# Patient Record
Sex: MALE | URBAN - METROPOLITAN AREA
[De-identification: names, ages, dates, MRNs, and addresses within clinical notes are randomized per-mention and may not be internally consistent; named-entity substitution may affect disease eponyms.]

---

## 2023-11-22 ENCOUNTER — APPOINTMENT (RX ONLY)
Dept: URBAN - METROPOLITAN AREA CLINIC 74 | Facility: CLINIC | Age: 63
Setting detail: DERMATOLOGY
End: 2023-11-22

## 2023-11-22 DIAGNOSIS — I87.2 VENOUS INSUFFICIENCY (CHRONIC) (PERIPHERAL): ICD-10-CM

## 2023-11-22 DIAGNOSIS — M79.3 PANNICULITIS, UNSPECIFIED: ICD-10-CM

## 2023-11-22 PROBLEM — I83.11 VARICOSE VEINS OF RIGHT LOWER EXTREMITY WITH INFLAMMATION: Status: ACTIVE | Noted: 2023-11-22

## 2023-11-22 PROCEDURE — ? PATIENT SPECIFIC COUNSELING

## 2023-11-22 PROCEDURE — ? COUNSELING

## 2023-11-22 PROCEDURE — ? PRESCRIPTION

## 2023-11-22 PROCEDURE — ? TREATMENT REGIMEN

## 2023-11-22 PROCEDURE — 99204 OFFICE O/P NEW MOD 45 MIN: CPT

## 2023-11-22 RX ORDER — MUPIROCIN 20 MG/G
OINTMENT TOPICAL
Qty: 22 | Refills: 2 | Status: ERX | COMMUNITY
Start: 2023-11-22

## 2023-11-22 RX ADMIN — MUPIROCIN: 20 OINTMENT TOPICAL at 00:00

## 2023-11-22 ASSESSMENT — LOCATION DETAILED DESCRIPTION DERM
LOCATION DETAILED: RIGHT PROXIMAL PRETIBIAL REGION
LOCATION DETAILED: RIGHT DISTAL PRETIBIAL REGION

## 2023-11-22 ASSESSMENT — LOCATION SIMPLE DESCRIPTION DERM: LOCATION SIMPLE: RIGHT PRETIBIAL REGION

## 2023-11-22 ASSESSMENT — LOCATION ZONE DERM: LOCATION ZONE: LEG

## 2023-11-22 NOTE — PROCEDURE: MIPS QUALITY
Quality 111:Pneumonia Vaccination Status For Older Adults: Patient did not receive any pneumococcal conjugate or polysaccharide vaccine on or after their 60th birthday and before the end of the measurement period
Quality 226: Preventive Care And Screening: Tobacco Use: Screening And Cessation Intervention: Patient screened for tobacco use and is an ex/non-smoker
Quality 130: Documentation Of Current Medications In The Medical Record: Current Medications Documented
Additional Notes: Pt denied pneumonia vaccine
Quality 110: Preventive Care And Screening: Influenza Immunization: Influenza Immunization Administered during Influenza season
Detail Level: Detailed

## 2023-11-22 NOTE — HPI: RASH
How Severe Is Your Rash?: moderate
Is This A New Presentation, Or A Follow-Up?: Rash
Additional History: Pt presents with a rash on his right leg, he explains that he has neuropathy so he doesn’t have much feeling but when his wife cleans it he feels burning occasionally. Pts wife explains she cleans it with saline and neosporin.

## 2023-11-22 NOTE — PROCEDURE: PATIENT SPECIFIC COUNSELING
Detail Level: Zone
Discussed its Increased pressure building up in the blood bessels is causing build up and backflow pf serious fluid causing the wheeping \\nInformed pt adequate oxygen and tissue is not getting to the tissue
Discussed cell turnover rate is slowed so \\nInformed pt that it’s basically a chronic condition but there are things to apply to make it look better\\nSending mupirocin \\nAlso recommend Amlactin lotion as well as cetaphil healing ointment\\nRecommended pt soak foot in epson salt bath, not too hot to help exfoliate \\nInformed pt it may take forever to heal

## 2023-11-22 NOTE — PROCEDURE: TREATMENT REGIMEN
Initiate Treatment: mupirocin 2 % topical ointment \\nQuantity: 22.0 g  Days Supply: 30\\nSig: Apply to wound daily
Otc Regimen: Amlatin lotion\\nCetaphil healing ointment\\nEpson salt bath soaks
Detail Level: Zone

## 2023-12-22 ENCOUNTER — APPOINTMENT (RX ONLY)
Dept: URBAN - METROPOLITAN AREA CLINIC 74 | Facility: CLINIC | Age: 63
Setting detail: DERMATOLOGY
End: 2023-12-22

## 2023-12-22 DIAGNOSIS — I87.2 VENOUS INSUFFICIENCY (CHRONIC) (PERIPHERAL): ICD-10-CM | Status: RESOLVING

## 2023-12-22 DIAGNOSIS — M79.3 PANNICULITIS, UNSPECIFIED: ICD-10-CM

## 2023-12-22 PROBLEM — I83.11 VARICOSE VEINS OF RIGHT LOWER EXTREMITY WITH INFLAMMATION: Status: ACTIVE | Noted: 2023-12-22

## 2023-12-22 PROCEDURE — 99213 OFFICE O/P EST LOW 20 MIN: CPT

## 2023-12-22 PROCEDURE — ? COUNSELING

## 2023-12-22 PROCEDURE — ? TREATMENT REGIMEN

## 2023-12-22 PROCEDURE — ? PATIENT SPECIFIC COUNSELING

## 2023-12-22 ASSESSMENT — LOCATION DETAILED DESCRIPTION DERM
LOCATION DETAILED: RIGHT DISTAL PRETIBIAL REGION
LOCATION DETAILED: RIGHT PROXIMAL PRETIBIAL REGION

## 2023-12-22 ASSESSMENT — LOCATION ZONE DERM: LOCATION ZONE: LEG

## 2023-12-22 ASSESSMENT — LOCATION SIMPLE DESCRIPTION DERM: LOCATION SIMPLE: RIGHT PRETIBIAL REGION

## 2023-12-22 NOTE — PROCEDURE: PATIENT SPECIFIC COUNSELING
Detail Level: Zone
Pt explains the mupirocin has been causing burning \\nInformed pt we did that to ensure no bacteria or infection\\nRecommend pt stop the mupirocin now because its likley causing kore irritation \\nRecommend pt continue the cerave healing ointment \\nPt mentions it still leaks \\nDiscussed the leaking is due to buildup of pressure \\nDiscussed and recommended compression stockings, 20-30, anytime leg is not elevated, to counteract pressure
Discussed cell turnover rate is slowed so \\nInformed pt that it’s basically a chronic condition but there are things to apply to make it look better\\nSending mupirocin \\nAlso recommend Amlactin lotion as well as cetaphil healing ointment\\nRecommended pt soak foot in epson salt bath, not too hot to help exfoliate \\nInformed pt it may take forever to heal

## 2023-12-22 NOTE — PROCEDURE: TREATMENT REGIMEN
Discontinue Regimen: mupirocin 2 % topical ointment \\nSig: Apply to wound daily
Otc Regimen: Cetaphil healing ointment\\nEpson salt bath soaks\\nCompression stockings 20-30
Detail Level: Zone

## 2024-03-22 ENCOUNTER — APPOINTMENT (RX ONLY)
Dept: URBAN - METROPOLITAN AREA CLINIC 74 | Facility: CLINIC | Age: 64
Setting detail: DERMATOLOGY
End: 2024-03-22

## 2024-03-22 DIAGNOSIS — I87.2 VENOUS INSUFFICIENCY (CHRONIC) (PERIPHERAL): ICD-10-CM | Status: RESOLVING

## 2024-03-22 DIAGNOSIS — M79.3 PANNICULITIS, UNSPECIFIED: ICD-10-CM

## 2024-03-22 PROCEDURE — ? COUNSELING

## 2024-03-22 PROCEDURE — 99213 OFFICE O/P EST LOW 20 MIN: CPT

## 2024-03-22 PROCEDURE — ? TREATMENT REGIMEN

## 2024-03-22 PROCEDURE — ? PATIENT SPECIFIC COUNSELING

## 2024-03-22 ASSESSMENT — LOCATION ZONE DERM: LOCATION ZONE: LEG

## 2024-03-22 ASSESSMENT — LOCATION SIMPLE DESCRIPTION DERM: LOCATION SIMPLE: RIGHT PRETIBIAL REGION

## 2024-03-22 NOTE — PROCEDURE: PATIENT SPECIFIC COUNSELING
Detail Level: Zone
Rechecked pts legs, see lots of improvement \\nDiscussed he’s still keeping up with compression stockings \\nDiscussed the antibiotics that he was on previously from pneumonia helped decrease inflammation \\nRecommended pt continue current treatment and rtc 6 months

## 2024-04-30 ENCOUNTER — APPOINTMENT (RX ONLY)
Dept: URBAN - METROPOLITAN AREA CLINIC 74 | Facility: CLINIC | Age: 64
Setting detail: DERMATOLOGY
End: 2024-04-30

## 2024-04-30 DIAGNOSIS — L24 IRRITANT CONTACT DERMATITIS: ICD-10-CM

## 2024-04-30 PROBLEM — L24.9 IRRITANT CONTACT DERMATITIS, UNSPECIFIED CAUSE: Status: ACTIVE | Noted: 2024-04-30

## 2024-04-30 PROCEDURE — ? COUNSELING

## 2024-04-30 PROCEDURE — ? PATIENT SPECIFIC COUNSELING

## 2024-04-30 PROCEDURE — ? PRESCRIPTION

## 2024-04-30 PROCEDURE — 99213 OFFICE O/P EST LOW 20 MIN: CPT

## 2024-04-30 PROCEDURE — ? TREATMENT REGIMEN

## 2024-04-30 RX ORDER — TRIAMCINOLONE ACETONIDE 0.25 MG/G
CREAM TOPICAL
Qty: 80 | Refills: 2 | Status: ERX | COMMUNITY
Start: 2024-04-30

## 2024-04-30 RX ADMIN — TRIAMCINOLONE ACETONIDE: 0.25 CREAM TOPICAL at 00:00

## 2024-04-30 ASSESSMENT — LOCATION DETAILED DESCRIPTION DERM: LOCATION DETAILED: LEFT KNEE

## 2024-04-30 ASSESSMENT — LOCATION ZONE DERM: LOCATION ZONE: LEG

## 2024-04-30 ASSESSMENT — LOCATION SIMPLE DESCRIPTION DERM: LOCATION SIMPLE: LEFT KNEE

## 2024-04-30 NOTE — PROCEDURE: TREATMENT REGIMEN
Samples Given: CeraVe healing ointment
Initiate Treatment: TAC 0.025% ointment mixed with Cera Ve healing ointment
Detail Level: Zone

## 2024-04-30 NOTE — PROCEDURE: PATIENT SPECIFIC COUNSELING
Detail Level: Zone
- pt got new prosthetic liner, no issues with previous liner before \\n- recommended they call prosthetic  to see if they have anything less sticky towards the skin, prosthetic liner is pulling and tearing skin \\n- recommended otc lidocaine cream for discomfort/burning, also recommended tegaderm\\nRTC 2-4 weeks

## 2024-04-30 NOTE — HPI: RASH
What Type Of Note Output Would You Prefer (Optional)?: Standard Output
How Severe Is Your Rash?: moderate
Is This A New Presentation, Or A Follow-Up?: Rash
Additional History: Pt and wife report rash started after he got new liner for prosthetic